# Patient Record
(demographics unavailable — no encounter records)

---

## 2025-05-05 NOTE — CONSULT LETTER
[Dear  ___] : Dear  [unfilled], [Courtesy Letter:] : I had the pleasure of seeing your patient, [unfilled], in my office today. [Please see my note below.] : Please see my note below. [Consult Closing:] : Thank you very much for allowing me to participate in the care of this patient.  If you have any questions, please do not hesitate to contact me. [Sincerely,] : Sincerely, [FreeTextEntry2] : Dr. Lexi Stovall  [address] [place] [id number] [FreeTextEntry3] : Julisa Londono MD\par  [facility]\par

## 2025-05-05 NOTE — PHYSICAL EXAM
[PERRLA] : DIONTE [Eyelids] : normal eyelids [Pupils] : pupils were equal and round [Palate] : normal palate [S1, S2 Present] : S1, S2 present [Cardiac Auscultation] : normal cardiac auscultation  [Peripheral Pulses] : positive peripheral pulses [Respiratory Effort] : normal respiratory effort [Clear to auscultation] : clear to auscultation [Soft] : soft [NonTender] : non tender [Non Distended] : non distended [Normal Bowel Sounds] : normal bowel sounds [No Hepatosplenomegaly] : no hepatosplenomegaly [No Abnormal Lymph Nodes Palpated] : no abnormal lymph nodes palpated [0] : 0 [Acute distress] : no acute distress [Rash] : no rash [Erythematous Conjunctiva] : nonerythematous conjunctiva [Erythematous Oropharynx] : nonerythematous oropharynx [Ulcers] : no ulcers [Lesions] : no lesions [Murmurs] : no murmurs [Peripheral Edema] : no peripheral edema  [de-identified] : +patellar grind left knee, no effusion or limitation noted; no other joint pain or swelling on exam, full range of motion throughout

## 2025-05-05 NOTE — REASON FOR VISIT
[Follow-Up: _____] : [unfilled] is  being seen for a [unfilled] follow-up visit [Patient] : patient [Father] : father [Other: _____] : [unfilled]

## 2025-05-05 NOTE — REVIEW OF SYSTEMS
[NI] : Endocrine [Nl] : Hematologic/Lymphatic [Wgt Gain (___ Lbs)] : recent [unfilled] lb weight gain [Joint Pains] : arthralgias [Immunizations are up to date] : Immunizations are up to date [Eye Pain] : no eye pain [Redness] : no redness [Blurry Vision] : no blurred vision [Vomiting] : no vomiting [Diarrhea] : no diarrhea [Decrease In Appetite] : no decrease in appetite [Abdominal Pain] : no abdominal pain [Limping] : no limping [Joint Swelling] : no joint swelling [Back Pain] : ~T no back pain [AM Stiffness] : no am stiffness [Headache] : no headache [FreeTextEntry1] : records maintained by [facility]\par

## 2025-05-05 NOTE — HISTORY OF PRESENT ILLNESS
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 [ARCHIE negative] : ARCHIE negative [RF negative] : RF negative [HLAB27 negative] : HLAB27 negative [Unlimited ADLs] : able to do activities of daily living without limitations [Unlimited Sports] : able to participate in sports without limitations [No] : no iritis [Morning Stiffness] : no morning stiffness [Abdominal Pain] : no abdominal pain [Weight Loss] : no weight loss [Fever] : no fever [Rash] : no rash [Eye Pain] : no eye pain [Redness] : no redness [Blurred Vision] : no blurred vision [Oral Ulcers] : no oral ulcers [FreeTextEntry1] : Prior left knee pain improved with PT but has had some recurrent pain in both knees recently L>R after senior trip to [place] and walking several miles a day.  No joint swelling.  No limping or limitations.  Taking motrin/tylenol PRN with relief.    No other new joint pain or swelling.  No hip or lower back pain.  No jaw pain or trouble chewing.  Saw [facility] 2/3/25 with normal exam, to f/u 1 year.  No fevers or recent illness.  No rashes.  No sores in the mouth or nose.  No difficulty swallowing.  No chest pain or shortness of breath.  No abdominal complaints or weight loss.  No weakness.  No headaches or focal neurological deficits.  No urinary changes.  No other new symptoms.  [JIASubtypeDate] : 02/10/2018 [[facility] : 02/03/2025

## 2025-05-05 NOTE — SOCIAL HISTORY
[Mother] : mother [Father] : father [Brother] : brother [Grade:  _____] : Grade: [unfilled] [FreeTextEntry1] : starting [employer] fall 2025